# Patient Record
Sex: MALE | Race: WHITE | URBAN - METROPOLITAN AREA
[De-identification: names, ages, dates, MRNs, and addresses within clinical notes are randomized per-mention and may not be internally consistent; named-entity substitution may affect disease eponyms.]

---

## 2021-08-09 ENCOUNTER — WEB ENCOUNTER (OUTPATIENT)
Dept: URBAN - METROPOLITAN AREA CLINIC 23 | Facility: CLINIC | Age: 76
End: 2021-08-09

## 2021-08-09 ENCOUNTER — OFFICE VISIT (OUTPATIENT)
Dept: URBAN - METROPOLITAN AREA CLINIC 23 | Facility: CLINIC | Age: 76
End: 2021-08-09
Payer: MEDICARE

## 2021-08-09 DIAGNOSIS — K21.9 GASTROESOPHAGEAL REFLUX DISEASE, UNSPECIFIED WHETHER ESOPHAGITIS PRESENT: ICD-10-CM

## 2021-08-09 PROCEDURE — G8420 CALC BMI NORM PARAMETERS: HCPCS | Performed by: INTERNAL MEDICINE

## 2021-08-09 PROCEDURE — G9622 NO UNHEAL ETOH USER: HCPCS | Performed by: INTERNAL MEDICINE

## 2021-08-09 PROCEDURE — 3017F COLORECTAL CA SCREEN DOC REV: CPT | Performed by: INTERNAL MEDICINE

## 2021-08-09 PROCEDURE — 1036F TOBACCO NON-USER: CPT | Performed by: INTERNAL MEDICINE

## 2021-08-09 PROCEDURE — G9903 PT SCRN TBCO ID AS NON USER: HCPCS | Performed by: INTERNAL MEDICINE

## 2021-08-09 PROCEDURE — 99203 OFFICE O/P NEW LOW 30 MIN: CPT | Performed by: INTERNAL MEDICINE

## 2021-08-09 PROCEDURE — G8427 DOCREV CUR MEDS BY ELIG CLIN: HCPCS | Performed by: INTERNAL MEDICINE

## 2021-08-09 RX ORDER — OMEPRAZOLE 40 MG/1
1 CAPSULE 30 MINUTES BEFORE MORNING MEAL CAPSULE, DELAYED RELEASE ORAL ONCE A DAY
Qty: 90 TABS | Refills: 1 | OUTPATIENT
Start: 2021-08-09

## 2021-08-09 NOTE — HPI-TODAY'S VISIT:
75-year-old  male with history of pulmonary embolism, brain tumor on chemotherapy with Dr. Jovi Carrion presents for swollen throat, rule out GERD. The onset was 6 months ago. He has been seeing ENT for swollen throat. ENT doctor told him it is acid reflux.  No trial of PPI yet.  He uses a spray for COPD to his throat which helps.  Denies heartburn or acid regurgitation.  Belching frequently recently.  No dysphagia.  Denies family history of esophageal cancer.  He had esophagram but no EGD previously.  Colonoscopy 10 years ago, normal.  No colon cancer history.

## 2021-08-17 ENCOUNTER — TELEPHONE ENCOUNTER (OUTPATIENT)
Dept: URBAN - METROPOLITAN AREA CLINIC 23 | Facility: CLINIC | Age: 76
End: 2021-08-17

## 2021-08-18 ENCOUNTER — OUT OF OFFICE VISIT (OUTPATIENT)
Dept: URBAN - METROPOLITAN AREA MEDICAL CENTER 27 | Facility: MEDICAL CENTER | Age: 76
End: 2021-08-18
Payer: MEDICARE

## 2021-08-18 DIAGNOSIS — R09.89 ABDOMINAL BRUIT: ICD-10-CM

## 2021-08-18 DIAGNOSIS — R13.10 ABNORMAL DEGLUTITION: ICD-10-CM

## 2021-08-18 DIAGNOSIS — J44.1 CHRONIC OBSTRUCTIVE PULMONARY DISEASE WITH (ACUTE) EXACERBATION: ICD-10-CM

## 2021-08-18 DIAGNOSIS — K21.9 ACID REFLUX: ICD-10-CM

## 2021-08-18 PROCEDURE — 99222 1ST HOSP IP/OBS MODERATE 55: CPT | Performed by: PHYSICIAN ASSISTANT

## 2021-08-18 PROCEDURE — G8427 DOCREV CUR MEDS BY ELIG CLIN: HCPCS | Performed by: PHYSICIAN ASSISTANT

## 2021-08-19 ENCOUNTER — OUT OF OFFICE VISIT (OUTPATIENT)
Dept: URBAN - METROPOLITAN AREA MEDICAL CENTER 27 | Facility: MEDICAL CENTER | Age: 76
End: 2021-08-19
Payer: MEDICARE

## 2021-08-19 DIAGNOSIS — K29.60 ADENOPAPILLOMATOSIS GASTRICA: ICD-10-CM

## 2021-08-19 DIAGNOSIS — R13.10 ABNORMAL DEGLUTITION: ICD-10-CM

## 2021-08-19 PROCEDURE — 43239 EGD BIOPSY SINGLE/MULTIPLE: CPT | Performed by: INTERNAL MEDICINE

## 2021-09-08 ENCOUNTER — WEB ENCOUNTER (OUTPATIENT)
Dept: URBAN - METROPOLITAN AREA CLINIC 6 | Facility: CLINIC | Age: 76
End: 2021-09-08

## 2021-09-21 ENCOUNTER — OFFICE VISIT (OUTPATIENT)
Dept: URBAN - METROPOLITAN AREA CLINIC 23 | Facility: CLINIC | Age: 76
End: 2021-09-21
Payer: MEDICARE

## 2021-09-21 DIAGNOSIS — R68.89 SENSATION OF SWOLLEN THROAT: ICD-10-CM

## 2021-09-21 PROCEDURE — 99213 OFFICE O/P EST LOW 20 MIN: CPT | Performed by: INTERNAL MEDICINE

## 2021-09-21 PROCEDURE — G8420 CALC BMI NORM PARAMETERS: HCPCS | Performed by: INTERNAL MEDICINE

## 2021-09-21 PROCEDURE — G9622 NO UNHEAL ETOH USER: HCPCS | Performed by: INTERNAL MEDICINE

## 2021-09-21 PROCEDURE — G8427 DOCREV CUR MEDS BY ELIG CLIN: HCPCS | Performed by: INTERNAL MEDICINE

## 2021-09-21 PROCEDURE — 1036F TOBACCO NON-USER: CPT | Performed by: INTERNAL MEDICINE

## 2021-09-21 PROCEDURE — 3017F COLORECTAL CA SCREEN DOC REV: CPT | Performed by: INTERNAL MEDICINE

## 2021-09-21 RX ORDER — OMEPRAZOLE 40 MG/1
1 CAPSULE 30 MINUTES BEFORE MORNING MEAL CAPSULE, DELAYED RELEASE ORAL ONCE A DAY
Qty: 90 TABS | Refills: 1 | Status: ACTIVE | COMMUNITY
Start: 2021-08-09

## 2021-09-21 NOTE — PREVIOUS WORKUP REVIEWED
. REVIEWED EXTERNAL MEDICAL RECORDENDOSCOPIES-EGD 8/19/2021: Small hiatal hernia. Moderate gastropathy in the antrum. Normal duodenum.*Pathology: Distal esophagus-normal. Proximal esophagus-normal. Gastric body and antrum-chronic inflammation, negative for H. pylori.LABS -Labs 8/17/2021: WBC 32.5, hemoglobin 14.2, platelet 253, creatinine 0.8, BUN 16, total bilirubin 0.6, alkaline phosphatase 167, AST 64, ALT 79.IMAGES -CT neck and soft tissue 8/17/2021: No suspicious mucosal or submucosal mass within the upper aerodigestive tract.

## 2021-09-21 NOTE — HPI-TODAY'S VISIT:
76M with sensation of swelling in the throat. Follow up visit. First encounter 8/9/2021 Hospitalized after trial of Prilosec c/o throat tightness. Inpt work up done for the swelling sensation.   He returns after EGD.  EGD was unremarkable but small hiatal hernia.  Biopsies from esophagus all negative.  He tried omeprazole but due to swelling, he suspect medication side effect, he stopped taking it.  He is following ENT and also pulmonologist for COPD.

## 2021-11-11 ENCOUNTER — TELEPHONE ENCOUNTER (OUTPATIENT)
Dept: URBAN - METROPOLITAN AREA CLINIC 6 | Facility: CLINIC | Age: 76
End: 2021-11-11

## 2022-03-15 ENCOUNTER — OFFICE VISIT (OUTPATIENT)
Dept: URBAN - METROPOLITAN AREA TELEHEALTH 2 | Facility: TELEHEALTH | Age: 77
End: 2022-03-15
Payer: MEDICARE

## 2022-03-15 ENCOUNTER — OFFICE VISIT (OUTPATIENT)
Dept: URBAN - METROPOLITAN AREA TELEHEALTH 2 | Facility: TELEHEALTH | Age: 77
End: 2022-03-15

## 2022-03-15 ENCOUNTER — TELEPHONE ENCOUNTER (OUTPATIENT)
Dept: URBAN - METROPOLITAN AREA CLINIC 92 | Facility: CLINIC | Age: 77
End: 2022-03-15

## 2022-03-15 DIAGNOSIS — K52.9 CHRONIC DIARRHEA: ICD-10-CM

## 2022-03-15 PROCEDURE — 99204 OFFICE O/P NEW MOD 45 MIN: CPT | Performed by: STUDENT IN AN ORGANIZED HEALTH CARE EDUCATION/TRAINING PROGRAM

## 2022-03-15 PROCEDURE — 99214 OFFICE O/P EST MOD 30 MIN: CPT | Performed by: STUDENT IN AN ORGANIZED HEALTH CARE EDUCATION/TRAINING PROGRAM

## 2022-03-15 RX ORDER — SODIUM PICOSULFATE, MAGNESIUM OXIDE, AND ANHYDROUS CITRIC ACID 10; 3.5; 12 MG/160ML; G/160ML; G/160ML
160 ML LIQUID ORAL
Qty: 320 MILLILITER | Refills: 0 | OUTPATIENT
Start: 2022-03-15 | End: 2022-03-16

## 2022-03-15 RX ORDER — OMEPRAZOLE 40 MG/1
1 CAPSULE 30 MINUTES BEFORE MORNING MEAL CAPSULE, DELAYED RELEASE ORAL ONCE A DAY
Qty: 90 TABS | Refills: 1 | Status: ACTIVE | COMMUNITY
Start: 2021-08-09

## 2022-03-15 NOTE — HPI-TODAY'S VISIT:
76-year-old male New to me Comes in for evaluation of diarrhea. Patient reports having 1 large volume diarrhea every day which drains out his energy.  There is no repeat bowel movement in entire day. There is no blood in the stool. This has been going on since January of this year. Patient has history of neuroendocrine tumor with brain metastasis for which patient has underwent treatment.  There was no other source or primary neuroendocrine tumor found on PET scan done by his oncologist.  Patient also had blood work and stool studies which have remained nonrevealing to explain patient diarrhea.  Patient has underwent chemotherapy and immunotherapy and radiation treatment in the management of his brain metastasis.  At 1 point it was felt that patient diarrhea could be coming from immunotherapy and patient was given a trial of steroids. I do not have records from the oncology to assess exactly what blood work and stool studies have been done in the past. Patient reports that patient last colonoscopy was more than 10 years back. Patient did had EGD last year with biopsies which were within normal limit. Currently patient reports that with dairy products but other than that there is no his diarrhea is worse linked with food intake.

## 2022-03-21 ENCOUNTER — WEB ENCOUNTER (OUTPATIENT)
Dept: URBAN - METROPOLITAN AREA CLINIC 40 | Facility: CLINIC | Age: 77
End: 2022-03-21

## 2022-03-22 ENCOUNTER — WEB ENCOUNTER (OUTPATIENT)
Dept: URBAN - METROPOLITAN AREA CLINIC 40 | Facility: CLINIC | Age: 77
End: 2022-03-22

## 2022-03-23 ENCOUNTER — TELEPHONE ENCOUNTER (OUTPATIENT)
Dept: URBAN - METROPOLITAN AREA CLINIC 74 | Facility: CLINIC | Age: 77
End: 2022-03-23

## 2022-08-31 ENCOUNTER — DASHBOARD ENCOUNTERS (OUTPATIENT)
Age: 77
End: 2022-08-31

## 2022-08-31 ENCOUNTER — OFFICE VISIT (OUTPATIENT)
Dept: URBAN - METROPOLITAN AREA CLINIC 12 | Facility: CLINIC | Age: 77
End: 2022-08-31
Payer: MEDICARE

## 2022-08-31 VITALS
HEART RATE: 77 BPM | SYSTOLIC BLOOD PRESSURE: 130 MMHG | DIASTOLIC BLOOD PRESSURE: 78 MMHG | WEIGHT: 168 LBS | BODY MASS INDEX: 24.88 KG/M2 | HEIGHT: 69 IN | TEMPERATURE: 97.3 F

## 2022-08-31 DIAGNOSIS — R19.7 DIARRHEA, UNSPECIFIED TYPE: ICD-10-CM

## 2022-08-31 DIAGNOSIS — D3A.8 NEUROENDOCRINE TUMOR: ICD-10-CM

## 2022-08-31 PROCEDURE — 99214 OFFICE O/P EST MOD 30 MIN: CPT | Performed by: STUDENT IN AN ORGANIZED HEALTH CARE EDUCATION/TRAINING PROGRAM

## 2022-08-31 RX ORDER — OMEPRAZOLE 40 MG/1
1 CAPSULE 30 MINUTES BEFORE MORNING MEAL CAPSULE, DELAYED RELEASE ORAL ONCE A DAY
Qty: 90 TABS | Refills: 1 | Status: ACTIVE | COMMUNITY
Start: 2021-08-09

## 2022-08-31 RX ORDER — PREDNISONE 10 MG/1
1 TABLET TABLET ORAL ONCE A DAY
Status: ACTIVE | COMMUNITY

## 2022-08-31 NOTE — HPI-TODAY'S VISIT:
76 yo M with h/o kidney CA s/p nephrectomy, PE x2 on eliquis, COPD on O2 at night, neuroendocrine tumor with brain mets, unclear primary source.  He underwent chemo, radiation x3, and immunotherapy.  There was some suspicion that the source of NET may hae been in the lungs. Sx since January, has been consistent. One BM daily, extremely large volume.  No h/o colon polyps Uses O2 at night. Has tried pepto, kayopectate, imodium. Not much relief.

## 2022-09-01 ENCOUNTER — TELEPHONE ENCOUNTER (OUTPATIENT)
Dept: URBAN - METROPOLITAN AREA CLINIC 6 | Facility: CLINIC | Age: 77
End: 2022-09-01

## 2022-09-26 ENCOUNTER — WEB ENCOUNTER (OUTPATIENT)
Dept: URBAN - METROPOLITAN AREA CLINIC 111 | Facility: CLINIC | Age: 77
End: 2022-09-26

## 2022-10-18 LAB
5-HIAA, URINE, 24HR: 5.6
5-HIAA, URINE: 3.4
CALPROTECTIN, FECAL: 99
GASTRIN, SERUM: <10
VIP, PLASMA: 35.6

## 2022-11-04 ENCOUNTER — TELEPHONE ENCOUNTER (OUTPATIENT)
Dept: URBAN - METROPOLITAN AREA CLINIC 6 | Facility: CLINIC | Age: 77
End: 2022-11-04

## 2022-11-28 ENCOUNTER — TELEPHONE ENCOUNTER (OUTPATIENT)
Dept: URBAN - METROPOLITAN AREA CLINIC 12 | Facility: CLINIC | Age: 77
End: 2022-11-28

## 2023-03-11 ENCOUNTER — OUT OF OFFICE VISIT (OUTPATIENT)
Dept: URBAN - METROPOLITAN AREA MEDICAL CENTER 27 | Facility: MEDICAL CENTER | Age: 78
End: 2023-03-11

## 2023-03-11 ENCOUNTER — CLAIMS CREATED FROM THE CLAIM WINDOW (OUTPATIENT)
Dept: URBAN - METROPOLITAN AREA MEDICAL CENTER 27 | Facility: MEDICAL CENTER | Age: 78
End: 2023-03-11
Payer: MEDICARE

## 2023-03-11 DIAGNOSIS — K62.5 ANAL BLEEDING: ICD-10-CM

## 2023-03-11 PROCEDURE — G8427 DOCREV CUR MEDS BY ELIG CLIN: HCPCS | Performed by: INTERNAL MEDICINE

## 2023-03-11 PROCEDURE — 99222 1ST HOSP IP/OBS MODERATE 55: CPT | Performed by: INTERNAL MEDICINE

## 2023-03-13 ENCOUNTER — OUT OF OFFICE VISIT (OUTPATIENT)
Dept: URBAN - METROPOLITAN AREA MEDICAL CENTER 27 | Facility: MEDICAL CENTER | Age: 78
End: 2023-03-13

## 2023-03-13 ENCOUNTER — CLAIMS CREATED FROM THE CLAIM WINDOW (OUTPATIENT)
Dept: URBAN - METROPOLITAN AREA MEDICAL CENTER 27 | Facility: MEDICAL CENTER | Age: 78
End: 2023-03-13
Payer: MEDICARE

## 2023-03-13 DIAGNOSIS — K92.1 ACUTE MELENA: ICD-10-CM

## 2023-03-13 DIAGNOSIS — D64.89 ANEMIA DUE TO OTHER CAUSE: ICD-10-CM

## 2023-03-13 DIAGNOSIS — R19.7 ACUTE DIARRHEA: ICD-10-CM

## 2023-03-13 PROCEDURE — 99232 SBSQ HOSP IP/OBS MODERATE 35: CPT | Performed by: INTERNAL MEDICINE
